# Patient Record
Sex: MALE | Race: OTHER | HISPANIC OR LATINO | URBAN - METROPOLITAN AREA
[De-identification: names, ages, dates, MRNs, and addresses within clinical notes are randomized per-mention and may not be internally consistent; named-entity substitution may affect disease eponyms.]

---

## 2017-12-29 ENCOUNTER — EMERGENCY (EMERGENCY)
Facility: HOSPITAL | Age: 37
LOS: 1 days | Discharge: HOME | End: 2017-12-29
Attending: EMERGENCY MEDICINE | Admitting: EMERGENCY MEDICINE
Payer: SELF-PAY

## 2017-12-29 VITALS
RESPIRATION RATE: 20 BRPM | HEART RATE: 86 BPM | DIASTOLIC BLOOD PRESSURE: 100 MMHG | OXYGEN SATURATION: 99 % | TEMPERATURE: 98 F | SYSTOLIC BLOOD PRESSURE: 139 MMHG | WEIGHT: 169.98 LBS

## 2017-12-29 LAB
ALBUMIN SERPL ELPH-MCNC: 4.6 G/DL — SIGNIFICANT CHANGE UP (ref 3.3–5.2)
ALP SERPL-CCNC: 98 U/L — SIGNIFICANT CHANGE UP (ref 40–120)
ALT FLD-CCNC: 54 U/L — HIGH
ANION GAP SERPL CALC-SCNC: 17 MMOL/L — SIGNIFICANT CHANGE UP (ref 5–17)
APTT BLD: 28.7 SEC — SIGNIFICANT CHANGE UP (ref 27.5–37.4)
AST SERPL-CCNC: 33 U/L — SIGNIFICANT CHANGE UP
BASOPHILS # BLD AUTO: 0 K/UL — SIGNIFICANT CHANGE UP (ref 0–0.2)
BASOPHILS NFR BLD AUTO: 0.1 % — SIGNIFICANT CHANGE UP (ref 0–2)
BILIRUB SERPL-MCNC: 0.4 MG/DL — SIGNIFICANT CHANGE UP (ref 0.4–2)
BUN SERPL-MCNC: 14 MG/DL — SIGNIFICANT CHANGE UP (ref 8–20)
CALCIUM SERPL-MCNC: 9.1 MG/DL — SIGNIFICANT CHANGE UP (ref 8.6–10.2)
CHLORIDE SERPL-SCNC: 102 MMOL/L — SIGNIFICANT CHANGE UP (ref 98–107)
CO2 SERPL-SCNC: 21 MMOL/L — LOW (ref 22–29)
CREAT SERPL-MCNC: 0.75 MG/DL — SIGNIFICANT CHANGE UP (ref 0.5–1.3)
EOSINOPHIL # BLD AUTO: 0 K/UL — SIGNIFICANT CHANGE UP (ref 0–0.5)
EOSINOPHIL NFR BLD AUTO: 0.2 % — SIGNIFICANT CHANGE UP (ref 0–6)
ETHANOL SERPL-MCNC: 81 MG/DL — SIGNIFICANT CHANGE UP
GLUCOSE SERPL-MCNC: 108 MG/DL — SIGNIFICANT CHANGE UP (ref 70–115)
HCT VFR BLD CALC: 45.2 % — SIGNIFICANT CHANGE UP (ref 42–52)
HGB BLD-MCNC: 15.9 G/DL — SIGNIFICANT CHANGE UP (ref 14–18)
INR BLD: 1.01 RATIO — SIGNIFICANT CHANGE UP (ref 0.88–1.16)
LIDOCAIN IGE QN: 24 U/L — SIGNIFICANT CHANGE UP (ref 22–51)
LYMPHOCYTES # BLD AUTO: 1.3 K/UL — SIGNIFICANT CHANGE UP (ref 1–4.8)
LYMPHOCYTES # BLD AUTO: 7.4 % — LOW (ref 20–55)
MCHC RBC-ENTMCNC: 30.3 PG — SIGNIFICANT CHANGE UP (ref 27–31)
MCHC RBC-ENTMCNC: 35.2 G/DL — SIGNIFICANT CHANGE UP (ref 32–36)
MCV RBC AUTO: 86.1 FL — SIGNIFICANT CHANGE UP (ref 80–94)
MONOCYTES # BLD AUTO: 0.5 K/UL — SIGNIFICANT CHANGE UP (ref 0–0.8)
MONOCYTES NFR BLD AUTO: 3 % — SIGNIFICANT CHANGE UP (ref 3–10)
NEUTROPHILS # BLD AUTO: 16 K/UL — HIGH (ref 1.8–8)
NEUTROPHILS NFR BLD AUTO: 89.1 % — HIGH (ref 37–73)
PLATELET # BLD AUTO: 296 K/UL — SIGNIFICANT CHANGE UP (ref 150–400)
POTASSIUM SERPL-MCNC: 3.5 MMOL/L — SIGNIFICANT CHANGE UP (ref 3.5–5.3)
POTASSIUM SERPL-SCNC: 3.5 MMOL/L — SIGNIFICANT CHANGE UP (ref 3.5–5.3)
PROT SERPL-MCNC: 7.7 G/DL — SIGNIFICANT CHANGE UP (ref 6.6–8.7)
PROTHROM AB SERPL-ACNC: 11.1 SEC — SIGNIFICANT CHANGE UP (ref 9.8–12.7)
RBC # BLD: 5.25 M/UL — SIGNIFICANT CHANGE UP (ref 4.6–6.2)
RBC # FLD: 12.1 % — SIGNIFICANT CHANGE UP (ref 11–15.6)
SODIUM SERPL-SCNC: 140 MMOL/L — SIGNIFICANT CHANGE UP (ref 135–145)
WBC # BLD: 18 K/UL — HIGH (ref 4.8–10.8)
WBC # FLD AUTO: 18 K/UL — HIGH (ref 4.8–10.8)

## 2017-12-29 PROCEDURE — 99285 EMERGENCY DEPT VISIT HI MDM: CPT | Mod: 25

## 2017-12-29 PROCEDURE — 99284 EMERGENCY DEPT VISIT MOD MDM: CPT

## 2017-12-29 PROCEDURE — T1013: CPT

## 2017-12-29 PROCEDURE — 85027 COMPLETE CBC AUTOMATED: CPT

## 2017-12-29 PROCEDURE — 83690 ASSAY OF LIPASE: CPT

## 2017-12-29 PROCEDURE — 85730 THROMBOPLASTIN TIME PARTIAL: CPT

## 2017-12-29 PROCEDURE — 85610 PROTHROMBIN TIME: CPT

## 2017-12-29 PROCEDURE — 96375 TX/PRO/DX INJ NEW DRUG ADDON: CPT

## 2017-12-29 PROCEDURE — 80053 COMPREHEN METABOLIC PANEL: CPT

## 2017-12-29 PROCEDURE — 96374 THER/PROPH/DIAG INJ IV PUSH: CPT

## 2017-12-29 PROCEDURE — 80307 DRUG TEST PRSMV CHEM ANLYZR: CPT

## 2017-12-29 PROCEDURE — 36415 COLL VENOUS BLD VENIPUNCTURE: CPT

## 2017-12-29 RX ORDER — EMTRICITABINE AND TENOFOVIR DISOPROXIL FUMARATE 200; 300 MG/1; MG/1
1 TABLET, FILM COATED ORAL
Qty: 0 | Refills: 0 | COMMUNITY

## 2017-12-29 RX ORDER — METOCLOPRAMIDE HCL 10 MG
10 TABLET ORAL ONCE
Qty: 0 | Refills: 0 | Status: COMPLETED | OUTPATIENT
Start: 2017-12-29 | End: 2017-12-29

## 2017-12-29 RX ORDER — SODIUM CHLORIDE 9 MG/ML
1000 INJECTION INTRAMUSCULAR; INTRAVENOUS; SUBCUTANEOUS ONCE
Qty: 0 | Refills: 0 | Status: COMPLETED | OUTPATIENT
Start: 2017-12-29 | End: 2017-12-29

## 2017-12-29 RX ORDER — PANTOPRAZOLE SODIUM 20 MG/1
40 TABLET, DELAYED RELEASE ORAL ONCE
Qty: 0 | Refills: 0 | Status: COMPLETED | OUTPATIENT
Start: 2017-12-29 | End: 2017-12-29

## 2017-12-29 RX ADMIN — SODIUM CHLORIDE 1000 MILLILITER(S): 9 INJECTION INTRAMUSCULAR; INTRAVENOUS; SUBCUTANEOUS at 21:47

## 2017-12-29 RX ADMIN — Medication 10 MILLIGRAM(S): at 21:47

## 2017-12-29 RX ADMIN — PANTOPRAZOLE SODIUM 40 MILLIGRAM(S): 20 TABLET, DELAYED RELEASE ORAL at 21:48

## 2017-12-29 NOTE — ED PROVIDER NOTE - MEDICAL DECISION MAKING DETAILS
36 y/o M with alcohol intoxication will observe for sobriety. 38 y/o with accidental overdose of alcohol will observe and DC home.

## 2017-12-29 NOTE — ED PROVIDER NOTE - UNABLE TO OBTAIN
Hx limited due to acute alcohol intoxication. Dementia ROS limited due to acute alcohol intoxication.

## 2017-12-29 NOTE — ED ADULT NURSE NOTE - OBJECTIVE STATEMENT
Pt. presents to ED with c/o right hand stiffness and drinking for past two days. Pt. denies Hx of alcohol abuse or SI/HI. pt. in yellow gown as per protocol. HIV + on HAART therapy. compliant with meds

## 2017-12-29 NOTE — ED PROVIDER NOTE - OBJECTIVE STATEMENT
This is a 36 y/o patient with Hx of HIV BIB family for alcohol intoxication. Pt is sleeping comfortably in bed, but easily arousable. Affect consistent with alcohol intoxication. No signs of respiratory distress or deformities. Pt is unable to provide a reliable history at this time. History is limited due to suspected intoxication. As per significant other at bedside she reports pt does not usually drink and drank significantly today. This is a 38 y/o patient with Hx of HIV BIB family for alcohol intoxication. Pt is sleeping comfortably in bed, but easily arousable. Affect consistent with alcohol intoxication. No signs of respiratory distress or deformities. Pt is unable to provide a reliable history at this time. History is limited due to suspected intoxication. As per significant other at bedside she reports they were at a party, pt does not usually drink and drank two bottles of patron today.

## 2017-12-29 NOTE — ED STATDOCS - PROGRESS NOTE DETAILS
This is a 38 y/o M with a history of HIV triple level positive on HAART therapy presenting to the ED complaining of right hand stiffness since 1700 today. Patient reports that he was drinking today when he started to have right hand stiffness. Patient reports drinking Patron x 2 bottles today, Denies history of EtOH abuse that significant other verifies. Patient reports that his last drink was at 1700 today. Endorses nausea and vomiting. Significant other reports that patient is intoxicated. Denies abdominal pain. Focused eval, protocol orders entered. Pt to be moved to main ED for complete evaluation by another provider. Patient actively hiccuping during examination.

## 2017-12-30 VITALS
OXYGEN SATURATION: 100 % | SYSTOLIC BLOOD PRESSURE: 128 MMHG | RESPIRATION RATE: 18 BRPM | HEART RATE: 88 BPM | TEMPERATURE: 98 F | DIASTOLIC BLOOD PRESSURE: 92 MMHG

## 2022-08-15 NOTE — ED PROVIDER NOTE - CPE EDP GASTRO NORM
CLINICAL PHARMACY NOTE: MEDS TO BEDS    Total # of Prescriptions Filled: 1   The following medications were delivered to the patient:  Marc    Additional Documentation: normal...

## 2022-10-12 NOTE — ED ADULT NURSE NOTE - NS ED NOTE  TALK SOMEONE YN
Manasa Perrin  Phone: [phone]   Fax: [phone]          Physical Therapy Treatment Note/ Progress Report:     Date:  10/12/2022    Patient Name:  Estefany Blas    :  1983  MRN: [id number]  Alvarado Hospital Medical CenterSINCERE    [email or web address]    Exercises  08040)   [] EVAL - MOD (74668)  [] EVAL - HIGH (68107)  [] RE-EVAL (53917)  [x] ZR(17792) x 1 TJ    [] Ionto  [x] NMR (11209) x1  JS [] Vaso  [x] Manual (25305) x 2      [] Ultrasound  [] TA x   1     [] Mech Traction (25402)  [] Aquatic Therapy x     [] ES (un) (22416):   [] Home Management Training x  [] ES(attended) (71872)   [x] Dry Needling 1-2 muscles (58231):  [] Dry Needling 3+ muscles (507894  [] [employer]   []  Not Ready for Return to Sports   Comments:    PLAN:  Continue to monitor posture   [x] Continue per plan of care [] Alter current plan (see comments)  [] Plan of care initiated [] Hold pending MD visit [] Discharge    Electronically signed by: Magda Flanagan PT, #[id number]  Assisted by Yenifer Barbosa. [address]        Note: If patient does not return for scheduled/ recommended follow up visits, this note will serve as a discharge from care along with most recent update on progress. No

## 2024-05-24 ENCOUNTER — EMERGENCY (EMERGENCY)
Facility: HOSPITAL | Age: 44
LOS: 1 days | Discharge: HOME | End: 2024-05-24
Attending: STUDENT IN AN ORGANIZED HEALTH CARE EDUCATION/TRAINING PROGRAM
Payer: SELF-PAY

## 2024-05-24 VITALS
TEMPERATURE: 98 F | HEART RATE: 65 BPM | RESPIRATION RATE: 20 BRPM | OXYGEN SATURATION: 98 % | WEIGHT: 155.43 LBS | DIASTOLIC BLOOD PRESSURE: 106 MMHG | SYSTOLIC BLOOD PRESSURE: 149 MMHG | HEIGHT: 62.99 IN

## 2024-05-24 PROBLEM — B20 HUMAN IMMUNODEFICIENCY VIRUS [HIV] DISEASE: Chronic | Status: ACTIVE | Noted: 2017-12-29

## 2024-05-24 PROCEDURE — 99284 EMERGENCY DEPT VISIT MOD MDM: CPT

## 2024-05-24 PROCEDURE — T1013: CPT

## 2024-05-24 PROCEDURE — 99283 EMERGENCY DEPT VISIT LOW MDM: CPT

## 2024-05-24 RX ORDER — METHOCARBAMOL 500 MG/1
2 TABLET, FILM COATED ORAL
Qty: 18 | Refills: 0
Start: 2024-05-24 | End: 2024-05-26

## 2024-05-24 RX ORDER — ACETAMINOPHEN 500 MG
650 TABLET ORAL ONCE
Refills: 0 | Status: COMPLETED | OUTPATIENT
Start: 2024-05-24 | End: 2024-05-24

## 2024-05-24 RX ORDER — LIDOCAINE 4 G/100G
1 CREAM TOPICAL ONCE
Refills: 0 | Status: COMPLETED | OUTPATIENT
Start: 2024-05-24 | End: 2024-05-24

## 2024-05-24 RX ORDER — METHOCARBAMOL 500 MG/1
1500 TABLET, FILM COATED ORAL ONCE
Refills: 0 | Status: COMPLETED | OUTPATIENT
Start: 2024-05-24 | End: 2024-05-24

## 2024-05-24 RX ADMIN — Medication 650 MILLIGRAM(S): at 06:36

## 2024-05-24 RX ADMIN — LIDOCAINE 1 PATCH: 4 CREAM TOPICAL at 06:36

## 2024-05-24 RX ADMIN — METHOCARBAMOL 1500 MILLIGRAM(S): 500 TABLET, FILM COATED ORAL at 06:36

## 2024-05-24 NOTE — ED PROVIDER NOTE - OBJECTIVE STATEMENT
43 year old male pmhx HIV (HAART) present to ED for lower back pain. 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 pt states pain has progressively increased, worse with movement. pt states he has taken advil without relief. pt denies numbness, tingling, weakness, saddle anesthesia, incontinence, fever, weight loss, chills, SOB, CP, abd pain.

## 2024-05-24 NOTE — ED PROVIDER NOTE - CLINICAL SUMMARY MEDICAL DECISION MAKING FREE TEXT BOX
43 year old male pmhx HIV (HAART) present to ED for lower back pain. 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 pt states pain has progressively increased, worse with movement. pt states he has taken advil without relief. pt denies numbness, tingling, weakness, saddle anesthesia, incontinence, fever, weight loss, chills, SOB, CP, abd pain.   Meds, re-assess 43 year old male pmhx HIV (HAART) present to ED for lower back pain. 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 pt states pain has progressively increased, worse with movement. pt states he has taken advil without relief. pt denies numbness, tingling, weakness, saddle anesthesia, incontinence, fever, weight loss, chills, SOB, CP, abd pain.   Meds, re-assess    Pt reassessed, pt feeling better at this time, vss, pt able to walk, talk and vocalized plan of action. Discussed in depth and explained to pt in depth the next steps that need to be taken including proper follow up with PCP or specialists. All incidental findings were discussed with pt as well. Pt verbalized their concerns and all questions were answered. Pt understands dispo and wants discharge. Given good instructions when to return to ED, strict return precautions and importance of f/u.

## 2024-05-24 NOTE — ED ADULT NURSE NOTE - OBJECTIVE STATEMENT
A&OX4, presents to ED with c/o lower back pain, patient reports pain started three days ago, while he was cleaning his car.  Denies trauma/injury/bowel/bladder incontinence.  Last Advil 3 hours PTA

## 2024-05-24 NOTE — ED PROVIDER NOTE - PHYSICAL EXAMINATION
Gen: No acute distress, non toxic  HEENT: Mucous membranes moist, pink conjunctivae, EOMI. PERRL. Airway patent.   CV: RRR, nl s1/s2.  Resp: CTAB, normal rate and effort. No wheezes, rhonchi, or crackles.   GI: Abdomen soft, NT, ND. No rebound, no guarding  Neuro: A&O x4, MAEx4. 5/5 str ext x 4. Sensation intact, symmetric throughout. No FND's. Gait intact. CN 2-12 intact.   MSK: b/l lumbar paraspinal muscle ttp. No midline spinal ttp. No visualized or palpable deformities.  Skin: No rashes, skin intact and well perfused. Cap refill <2sec  Vascular: Radial and dorsalis pedal pulses 2+ b/l

## 2024-05-24 NOTE — ED ADULT TRIAGE NOTE - CHIEF COMPLAINT QUOTE
Patient presents to ED with c/o lower back pain times 3 days.  Pain worse with movement.  Denies trauma/injury/bowel/bladder incontinence.  Last Advil 3 hours PTA

## 2024-05-24 NOTE — ED PROVIDER NOTE - ATTENDING APP SHARED VISIT CONTRIBUTION OF CARE
43M hx of HIV, on HAART, presenting with lower back pain in the setting of bending to clean his car a few days ago. Denies any numbness/weakness, no tingling, no saddle anesthesia, denies other sx of concern at this time. Will trial him on supportive meds, reassess, dispo.

## 2024-05-24 NOTE — ED ADULT NURSE NOTE - NSFALLUNIVINTERV_ED_ALL_ED
Bed/Stretcher in lowest position, wheels locked, appropriate side rails in place/Call bell, personal items and telephone in reach/Instruct patient to call for assistance before getting out of bed/chair/stretcher/Non-slip footwear applied when patient is off stretcher/[place] to call system/Physically safe environment - no spills, clutter or unnecessary equipment/Purposeful proactive rounding/Room/bathroom lighting operational, light cord in reach

## 2024-05-24 NOTE — ED PROVIDER NOTE - PATIENT PORTAL LINK FT
You can access the FollowMyHealth Patient Portal offered by [facility] by registering at the following website: [email or web address]. By joining [employer]’s FollowMyHealth portal, you will also be able to view your health information using other applications (apps) compatible with our system.

## 2024-05-24 NOTE — ED ADULT NURSE NOTE - NS ED NOTE  TALK SOMEONE YN
Chino Vasquez RN made aware of ejection fraction of 57%, from prior 71% 6 months ago  Stated that office was aware, no new orders at this time  No

## 2024-08-26 ENCOUNTER — INPATIENT (INPATIENT)
Facility: HOSPITAL | Age: 44
LOS: 1 days | Discharge: HOME | DRG: 392 | End: 2024-08-28
Attending: STUDENT IN AN ORGANIZED HEALTH CARE EDUCATION/TRAINING PROGRAM | Admitting: STUDENT IN AN ORGANIZED HEALTH CARE EDUCATION/TRAINING PROGRAM
Payer: MEDICAID

## 2024-08-26 VITALS
WEIGHT: 196.87 LBS | SYSTOLIC BLOOD PRESSURE: 145 MMHG | OXYGEN SATURATION: 100 % | HEART RATE: 65 BPM | TEMPERATURE: 98 F | DIASTOLIC BLOOD PRESSURE: 90 MMHG | RESPIRATION RATE: 17 BRPM

## 2024-08-26 DIAGNOSIS — R10.9 UNSPECIFIED ABDOMINAL PAIN: ICD-10-CM

## 2024-08-26 LAB
ALBUMIN SERPL ELPH-MCNC: 4.3 G/DL — SIGNIFICANT CHANGE UP (ref 3.3–5.2)
ALP SERPL-CCNC: 103 U/L — SIGNIFICANT CHANGE UP (ref 40–120)
ALT FLD-CCNC: 34 U/L — SIGNIFICANT CHANGE UP
ANION GAP SERPL CALC-SCNC: 16 MMOL/L — SIGNIFICANT CHANGE UP (ref 5–17)
APPEARANCE UR: CLEAR — SIGNIFICANT CHANGE UP
APTT BLD: 29 SEC — SIGNIFICANT CHANGE UP (ref 24.5–35.6)
AST SERPL-CCNC: 24 U/L — SIGNIFICANT CHANGE UP
BASOPHILS # BLD AUTO: 0.03 K/UL — SIGNIFICANT CHANGE UP (ref 0–0.2)
BASOPHILS NFR BLD AUTO: 0.2 % — SIGNIFICANT CHANGE UP (ref 0–2)
BILIRUB SERPL-MCNC: 0.4 MG/DL — SIGNIFICANT CHANGE UP (ref 0.4–2)
BILIRUB UR-MCNC: NEGATIVE — SIGNIFICANT CHANGE UP
BUN SERPL-MCNC: 12.8 MG/DL — SIGNIFICANT CHANGE UP (ref 8–20)
CALCIUM SERPL-MCNC: 9.2 MG/DL — SIGNIFICANT CHANGE UP (ref 8.4–10.5)
CHLORIDE SERPL-SCNC: 105 MMOL/L — SIGNIFICANT CHANGE UP (ref 96–108)
CO2 SERPL-SCNC: 22 MMOL/L — SIGNIFICANT CHANGE UP (ref 22–29)
COLOR SPEC: YELLOW — SIGNIFICANT CHANGE UP
CREAT SERPL-MCNC: 0.79 MG/DL — SIGNIFICANT CHANGE UP (ref 0.5–1.3)
DIFF PNL FLD: NEGATIVE — SIGNIFICANT CHANGE UP
EGFR: 114 ML/MIN/1.73M2 — SIGNIFICANT CHANGE UP
EOSINOPHIL # BLD AUTO: 0.04 K/UL — SIGNIFICANT CHANGE UP (ref 0–0.5)
EOSINOPHIL NFR BLD AUTO: 0.3 % — SIGNIFICANT CHANGE UP (ref 0–6)
GLUCOSE SERPL-MCNC: 111 MG/DL — HIGH (ref 70–99)
GLUCOSE UR QL: NEGATIVE MG/DL — SIGNIFICANT CHANGE UP
HCT VFR BLD CALC: 43 % — SIGNIFICANT CHANGE UP (ref 39–50)
HGB BLD-MCNC: 15.5 G/DL — SIGNIFICANT CHANGE UP (ref 13–17)
IMM GRANULOCYTES NFR BLD AUTO: 0.3 % — SIGNIFICANT CHANGE UP (ref 0–0.9)
INR BLD: 1.06 RATIO — SIGNIFICANT CHANGE UP (ref 0.85–1.18)
KETONES UR-MCNC: NEGATIVE MG/DL — SIGNIFICANT CHANGE UP
LEUKOCYTE ESTERASE UR-ACNC: NEGATIVE — SIGNIFICANT CHANGE UP
LIDOCAIN IGE QN: 33 U/L — SIGNIFICANT CHANGE UP (ref 22–51)
LYMPHOCYTES # BLD AUTO: 1.8 K/UL — SIGNIFICANT CHANGE UP (ref 1–3.3)
LYMPHOCYTES # BLD AUTO: 12.2 % — LOW (ref 13–44)
MCHC RBC-ENTMCNC: 30.9 PG — SIGNIFICANT CHANGE UP (ref 27–34)
MCHC RBC-ENTMCNC: 36 GM/DL — SIGNIFICANT CHANGE UP (ref 32–36)
MCV RBC AUTO: 85.7 FL — SIGNIFICANT CHANGE UP (ref 80–100)
MONOCYTES # BLD AUTO: 0.56 K/UL — SIGNIFICANT CHANGE UP (ref 0–0.9)
MONOCYTES NFR BLD AUTO: 3.8 % — SIGNIFICANT CHANGE UP (ref 2–14)
NEUTROPHILS # BLD AUTO: 12.26 K/UL — HIGH (ref 1.8–7.4)
NEUTROPHILS NFR BLD AUTO: 83.2 % — HIGH (ref 43–77)
NITRITE UR-MCNC: NEGATIVE — SIGNIFICANT CHANGE UP
PH UR: 7.5 — SIGNIFICANT CHANGE UP (ref 5–8)
PLATELET # BLD AUTO: 278 K/UL — SIGNIFICANT CHANGE UP (ref 150–400)
POTASSIUM SERPL-MCNC: 3.4 MMOL/L — LOW (ref 3.5–5.3)
POTASSIUM SERPL-SCNC: 3.4 MMOL/L — LOW (ref 3.5–5.3)
PROT SERPL-MCNC: 7.6 G/DL — SIGNIFICANT CHANGE UP (ref 6.6–8.7)
PROT UR-MCNC: SIGNIFICANT CHANGE UP MG/DL
PROTHROM AB SERPL-ACNC: 11.7 SEC — SIGNIFICANT CHANGE UP (ref 9.5–13)
RBC # BLD: 5.02 M/UL — SIGNIFICANT CHANGE UP (ref 4.2–5.8)
RBC # FLD: 12.1 % — SIGNIFICANT CHANGE UP (ref 10.3–14.5)
SODIUM SERPL-SCNC: 143 MMOL/L — SIGNIFICANT CHANGE UP (ref 135–145)
SP GR SPEC: >=1.099 (ref 1–1.03)
UROBILINOGEN FLD QL: 0.2 MG/DL — SIGNIFICANT CHANGE UP (ref 0.2–1)
WBC # BLD: 14.74 K/UL — HIGH (ref 3.8–10.5)
WBC # FLD AUTO: 14.74 K/UL — HIGH (ref 3.8–10.5)

## 2024-08-26 PROCEDURE — 74177 CT ABD & PELVIS W/CONTRAST: CPT | Mod: 26,MC

## 2024-08-26 PROCEDURE — 76705 ECHO EXAM OF ABDOMEN: CPT | Mod: 26

## 2024-08-26 PROCEDURE — 99285 EMERGENCY DEPT VISIT HI MDM: CPT

## 2024-08-26 PROCEDURE — 93010 ELECTROCARDIOGRAM REPORT: CPT

## 2024-08-26 RX ORDER — ONDANSETRON 2 MG/ML
4 INJECTION, SOLUTION INTRAMUSCULAR; INTRAVENOUS ONCE
Refills: 0 | Status: COMPLETED | OUTPATIENT
Start: 2024-08-26 | End: 2024-08-26

## 2024-08-26 RX ORDER — CEFOTETAN 2 G/1
INJECTION, POWDER, FOR SOLUTION INTRAMUSCULAR; INTRAVENOUS
Refills: 0 | Status: DISCONTINUED | OUTPATIENT
Start: 2024-08-26 | End: 2024-08-27

## 2024-08-26 RX ORDER — CEFOTETAN 2 G/1
2 INJECTION, POWDER, FOR SOLUTION INTRAMUSCULAR; INTRAVENOUS ONCE
Refills: 0 | Status: COMPLETED | OUTPATIENT
Start: 2024-08-26 | End: 2024-08-26

## 2024-08-26 RX ORDER — POTASSIUM CHLORIDE 10 MEQ
20 TABLET, EXT RELEASE, PARTICLES/CRYSTALS ORAL ONCE
Refills: 0 | Status: COMPLETED | OUTPATIENT
Start: 2024-08-26 | End: 2024-08-26

## 2024-08-26 RX ORDER — SODIUM CHLORIDE 9 MG/ML
1000 INJECTION INTRAMUSCULAR; INTRAVENOUS; SUBCUTANEOUS ONCE
Refills: 0 | Status: COMPLETED | OUTPATIENT
Start: 2024-08-26 | End: 2024-08-26

## 2024-08-26 RX ORDER — ENOXAPARIN SODIUM 100 MG/ML
40 INJECTION SUBCUTANEOUS EVERY 24 HOURS
Refills: 0 | Status: DISCONTINUED | OUTPATIENT
Start: 2024-08-26 | End: 2024-08-27

## 2024-08-26 RX ORDER — CEFOTETAN 2 G/1
2 INJECTION, POWDER, FOR SOLUTION INTRAMUSCULAR; INTRAVENOUS EVERY 12 HOURS
Refills: 0 | Status: DISCONTINUED | OUTPATIENT
Start: 2024-08-27 | End: 2024-08-27

## 2024-08-26 RX ORDER — FAMOTIDINE 10 MG/ML
20 INJECTION INTRAVENOUS ONCE
Refills: 0 | Status: COMPLETED | OUTPATIENT
Start: 2024-08-26 | End: 2024-08-26

## 2024-08-26 RX ORDER — ACETAMINOPHEN 325 MG/1
975 TABLET ORAL EVERY 6 HOURS
Refills: 0 | Status: DISCONTINUED | OUTPATIENT
Start: 2024-08-26 | End: 2024-08-27

## 2024-08-26 RX ORDER — KETOROLAC TROMETHAMINE 30 MG/ML
30 INJECTION, SOLUTION INTRAMUSCULAR ONCE
Refills: 0 | Status: DISCONTINUED | OUTPATIENT
Start: 2024-08-26 | End: 2024-08-26

## 2024-08-26 RX ORDER — ACETAMINOPHEN 325 MG/1
1000 TABLET ORAL ONCE
Refills: 0 | Status: COMPLETED | OUTPATIENT
Start: 2024-08-26 | End: 2024-08-26

## 2024-08-26 RX ADMIN — SODIUM CHLORIDE 1000 MILLILITER(S): 9 INJECTION INTRAMUSCULAR; INTRAVENOUS; SUBCUTANEOUS at 01:43

## 2024-08-26 RX ADMIN — KETOROLAC TROMETHAMINE 30 MILLIGRAM(S): 30 INJECTION, SOLUTION INTRAMUSCULAR at 01:40

## 2024-08-26 RX ADMIN — ENOXAPARIN SODIUM 40 MILLIGRAM(S): 100 INJECTION SUBCUTANEOUS at 16:00

## 2024-08-26 RX ADMIN — ACETAMINOPHEN 975 MILLIGRAM(S): 325 TABLET ORAL at 17:19

## 2024-08-26 RX ADMIN — CEFOTETAN 100 GRAM(S): 2 INJECTION, POWDER, FOR SOLUTION INTRAMUSCULAR; INTRAVENOUS at 13:17

## 2024-08-26 RX ADMIN — Medication 100 MILLILITER(S): at 16:01

## 2024-08-26 RX ADMIN — Medication 20 MILLIEQUIVALENT(S): at 03:12

## 2024-08-26 RX ADMIN — ACETAMINOPHEN 400 MILLIGRAM(S): 325 TABLET ORAL at 03:48

## 2024-08-26 RX ADMIN — FAMOTIDINE 20 MILLIGRAM(S): 10 INJECTION INTRAVENOUS at 01:41

## 2024-08-26 RX ADMIN — ONDANSETRON 4 MILLIGRAM(S): 2 INJECTION, SOLUTION INTRAMUSCULAR; INTRAVENOUS at 01:42

## 2024-08-26 NOTE — ED ADULT NURSE NOTE - NS ED NURSE LEVEL OF CONSCIOUSNESS MENTAL STATUS
UVC repositioning     UVC projecting over PFO on CXR. Was pulled back 1 cm  to 9 cm      KESHA Patton-CNP 2019 2:01 PM     Awake/Alert

## 2024-08-26 NOTE — ED PROVIDER NOTE - NSFOLLOWUPINSTRUCTIONS_ED_ALL_ED_FT
Dieta líquida boubacar, avance lentamente a alimentos blandos según la tolerancia Consulte con roberts médico de atención primaria en 2 o 3 días Regrese a la taisha de emergencias si tiene síntomas nuevos o que empeoran  Náuseas/vómitos  Las náuseas son la sensación de tener que vomitar. A medida que las náuseas empeoran, pueden provocar vómitos. Los vómitos aumentan el riesgo de deshidratación. Los adultos mayores y las personas con otras enfermedades o un sistema inmunológico débil tienen un mayor riesgo de deshidratación. Lynn líquidos kourtney en cantidades pequeñas zaira frecuentes según la tolerancia. Coma alimentos blandos y fáciles de digerir en pequeñas cantidades según la tolerancia.  BUSQUE ATENCIÓN MÉDICA INMEDIATA SI TIENE ALGUNO DE LOS SIGUIENTES SÍNTOMAS: fiebre, incapacidad para retener suficientes líquidos, vómitos negros o con charlotte, heces negras o con charlotte, aturdimiento/vértigo, dolor en el pecho, dolor de peyton intenso, sarpullido, dificultad para respirar, piel fría o húmeda, confusión, dolor al orinar o cualquier signo de deshidratación.

## 2024-08-26 NOTE — ED PROVIDER NOTE - ATTENDING APP SHARED VISIT CONTRIBUTION OF CARE
43 yo M hx HIV presents to ER c/o generalized abdominal pain with nausea/vomiting since 4pm after having a late lunch. patient denied nausea, vomiting, fever, chills or diarrhea. no recent sick contacts.     I, Robinson Geiger, evaluated the patient with the PA, and completed the key components of the history and physical exam. I then discussed the management plan with the PA.

## 2024-08-26 NOTE — H&P ADULT - HISTORY OF PRESENT ILLNESS
HPI: 44y Male no PMHx present to ED with 1 day of abdominal pain , N, after his dinner yesterday. denies fevers/chills, denies lightheadedness/dizziness, denies SOB/chest pain, denies nausea/vomiting,     ROS: 10-system review is otherwise negative except HPI above.      PAST MEDICAL & SURGICAL HISTORY:  HIV (human immunodeficiency virus infection)        FAMILY HISTORY:    Family history not pertinent as reviewed with the patient.    SOCIAL HISTORY:  Denies any toxic habits    ALLERGIES: NKA No Known Allergies      Home Medications:  Descovy 200 mg-25 mg oral tablet: 1 tab(s) orally once a day (29 Dec 2017 22:05)      --------------------------------------------------------------------------------------------    PHYSICAL EXAM:   General: NAD, Lying in bed comfortably  Neuro: A+Ox3  HEENT: EOMI, PERRLA, MMM  Cardio: RRR  Resp: Non labored breathing on RA  GI/Abd: Soft, epigastric Tenderness /ND,   Vascular: All 4 extremities warm and well perfused.   Pelvis: stable  Musculoskeletal: All 4 extremities moving spontaneously, no limitations, no spinal tenderness.  --------------------------------------------------------------------------------------------    LABS                 15.5   14.74  )----------(  278       ( 26 Aug 2024 01:44 )               43.0      143    |  105    |  12.8   ----------------------------<  111        ( 26 Aug 2024 01:44 )  3.4     |  22.0   |  0.79     Ca    9.2        ( 26 Aug 2024 01:44 )    TPro  7.6    /  Alb  4.3    /  TBili  0.4    /  DBili  x      /  AST  24     /  ALT  34     /  AlkPhos  103    ( 26 Aug 2024 01:44 )    LIVER FUNCTIONS - ( 26 Aug 2024 01:44 )  Alb: 4.3 g/dL / Pro: 7.6 g/dL / ALK PHOS: 103 U/L / ALT: 34 U/L / AST: 24 U/L / GGT: x           PT/INR -  11.7 sec / 1.06 ratio   ( 26 Aug 2024 10:20 )       PTT -  29.0 sec   ( 26 Aug 2024 10:20 )  CAPILLARY BLOOD GLUCOSE        Urinalysis Basic - ( 26 Aug 2024 08:01 )    Color: Yellow / Appearance: Clear / SG: >=1.099 / pH: x  Gluc: x / Ketone: Negative mg/dL  / Bili: Negative / Urobili: 0.2 mg/dL   Blood: x / Protein: Trace mg/dL / Nitrite: Negative   Leuk Esterase: Negative / RBC: x / WBC x   Sq Epi: x / Non Sq Epi: x / Bacteria: x          --------------------------------------------------------------------------------------------

## 2024-08-26 NOTE — ED ADULT NURSE REASSESSMENT NOTE - NS ED NURSE REASSESS COMMENT FT1
Assumed care of patient at 07:15 from EVELYN Bal. Charting as noted. Patient AA&Ox4, resp even/unlabored, presents to ED c/o abd pain. At time of assessment, patient denies pain/discomfort, denies CP/SOB. Patient updated on the plan of care, awaiting CT result. Stretcher locked in lowest position, IV site flushed w/ NS. No redness, swelling or pain noted to site. No signs of acute distress noted, safety maintained.

## 2024-08-26 NOTE — H&P ADULT - ASSESSMENT
ASSESSMENT: Patient is a 44y old male with 1 day of abdominal pain leukocytosis , evidence of acute cholecystitis on CT scan     PLAN:    - Admit to [facility] floor -Dr Rivera   - NPO/IVF  - IV cefotetan   - pain control  - DVT ppx  - OOB/ambulate  - strict I/Os  - Plan discussed with Attending, Dr. Rivera

## 2024-08-26 NOTE — ED PROVIDER NOTE - CLINICAL SUMMARY MEDICAL DECISION MAKING FREE TEXT BOX
43 yo M hx HIV on HAART presents to ER c/o generalized abd pain with nausea/vomiting started at 4pm after eating rice. Afebrile, abdomen soft, mild epigastric tenderness. WBC 14, K 3.4. labs otherwise unremarkable. 43 yo M hx HIV on HAART presents to ER c/o generalized abd pain with nausea/vomiting started at 4pm after eating rice. Afebrile, abdomen soft, mild epigastric tenderness. WBC 14, K 3.4. labs otherwise unremarkable. CT ?gastritis, also with ?gb wall thickening and bladder wall thickening. Pt still with epigastric ttp on exam. Will add UA and US gallbladder, sign out to MARY knapp pending results

## 2024-08-26 NOTE — ED PROVIDER NOTE - OBJECTIVE STATEMENT
45 yo M hx HIV presents to ER c/o generalized abdominal pain with nausea/vomiting since 4pm, started after eating rice. Denies fever/chills, diarrhea, No past abd surgeries.     # [id number]

## 2024-08-27 ENCOUNTER — RESULT REVIEW (OUTPATIENT)
Age: 44
End: 2024-08-27

## 2024-08-27 LAB
ALBUMIN SERPL ELPH-MCNC: 3.6 G/DL — SIGNIFICANT CHANGE UP (ref 3.3–5.2)
ALP SERPL-CCNC: 77 U/L — SIGNIFICANT CHANGE UP (ref 40–120)
ALT FLD-CCNC: 26 U/L — SIGNIFICANT CHANGE UP
ANION GAP SERPL CALC-SCNC: 11 MMOL/L — SIGNIFICANT CHANGE UP (ref 5–17)
APTT BLD: 31.3 SEC — SIGNIFICANT CHANGE UP (ref 24.5–35.6)
AST SERPL-CCNC: 19 U/L — SIGNIFICANT CHANGE UP
BASOPHILS # BLD AUTO: 0.03 K/UL — SIGNIFICANT CHANGE UP (ref 0–0.2)
BASOPHILS NFR BLD AUTO: 0.4 % — SIGNIFICANT CHANGE UP (ref 0–2)
BILIRUB SERPL-MCNC: 0.6 MG/DL — SIGNIFICANT CHANGE UP (ref 0.4–2)
BLD GP AB SCN SERPL QL: SIGNIFICANT CHANGE UP
BUN SERPL-MCNC: 10.2 MG/DL — SIGNIFICANT CHANGE UP (ref 8–20)
CALCIUM SERPL-MCNC: 8.7 MG/DL — SIGNIFICANT CHANGE UP (ref 8.4–10.5)
CHLORIDE SERPL-SCNC: 106 MMOL/L — SIGNIFICANT CHANGE UP (ref 96–108)
CO2 SERPL-SCNC: 22 MMOL/L — SIGNIFICANT CHANGE UP (ref 22–29)
CREAT SERPL-MCNC: 0.73 MG/DL — SIGNIFICANT CHANGE UP (ref 0.5–1.3)
CULTURE RESULTS: SIGNIFICANT CHANGE UP
EGFR: 116 ML/MIN/1.73M2 — SIGNIFICANT CHANGE UP
EOSINOPHIL # BLD AUTO: 0.15 K/UL — SIGNIFICANT CHANGE UP (ref 0–0.5)
EOSINOPHIL NFR BLD AUTO: 2.2 % — SIGNIFICANT CHANGE UP (ref 0–6)
GLUCOSE BLDC GLUCOMTR-MCNC: 117 MG/DL — HIGH (ref 70–99)
GLUCOSE SERPL-MCNC: 85 MG/DL — SIGNIFICANT CHANGE UP (ref 70–99)
HCT VFR BLD CALC: 40.6 % — SIGNIFICANT CHANGE UP (ref 39–50)
HGB BLD-MCNC: 14 G/DL — SIGNIFICANT CHANGE UP (ref 13–17)
IMM GRANULOCYTES NFR BLD AUTO: 0.3 % — SIGNIFICANT CHANGE UP (ref 0–0.9)
INR BLD: 1.08 RATIO — SIGNIFICANT CHANGE UP (ref 0.85–1.18)
LYMPHOCYTES # BLD AUTO: 2.51 K/UL — SIGNIFICANT CHANGE UP (ref 1–3.3)
LYMPHOCYTES # BLD AUTO: 36.9 % — SIGNIFICANT CHANGE UP (ref 13–44)
MCHC RBC-ENTMCNC: 30.7 PG — SIGNIFICANT CHANGE UP (ref 27–34)
MCHC RBC-ENTMCNC: 34.5 GM/DL — SIGNIFICANT CHANGE UP (ref 32–36)
MCV RBC AUTO: 89 FL — SIGNIFICANT CHANGE UP (ref 80–100)
MONOCYTES # BLD AUTO: 0.56 K/UL — SIGNIFICANT CHANGE UP (ref 0–0.9)
MONOCYTES NFR BLD AUTO: 8.2 % — SIGNIFICANT CHANGE UP (ref 2–14)
NEUTROPHILS # BLD AUTO: 3.53 K/UL — SIGNIFICANT CHANGE UP (ref 1.8–7.4)
NEUTROPHILS NFR BLD AUTO: 52 % — SIGNIFICANT CHANGE UP (ref 43–77)
PLATELET # BLD AUTO: 260 K/UL — SIGNIFICANT CHANGE UP (ref 150–400)
POTASSIUM SERPL-MCNC: 3.5 MMOL/L — SIGNIFICANT CHANGE UP (ref 3.5–5.3)
POTASSIUM SERPL-SCNC: 3.5 MMOL/L — SIGNIFICANT CHANGE UP (ref 3.5–5.3)
PROT SERPL-MCNC: 6.2 G/DL — LOW (ref 6.6–8.7)
PROTHROM AB SERPL-ACNC: 12 SEC — SIGNIFICANT CHANGE UP (ref 9.5–13)
RBC # BLD: 4.56 M/UL — SIGNIFICANT CHANGE UP (ref 4.2–5.8)
RBC # FLD: 12.5 % — SIGNIFICANT CHANGE UP (ref 10.3–14.5)
SODIUM SERPL-SCNC: 139 MMOL/L — SIGNIFICANT CHANGE UP (ref 135–145)
SPECIMEN SOURCE: SIGNIFICANT CHANGE UP
WBC # BLD: 6.8 K/UL — SIGNIFICANT CHANGE UP (ref 3.8–10.5)
WBC # FLD AUTO: 6.8 K/UL — SIGNIFICANT CHANGE UP (ref 3.8–10.5)

## 2024-08-27 PROCEDURE — 99232 SBSQ HOSP IP/OBS MODERATE 35: CPT | Mod: 57

## 2024-08-27 PROCEDURE — 88304 TISSUE EXAM BY PATHOLOGIST: CPT | Mod: 26

## 2024-08-27 PROCEDURE — 47562 LAPAROSCOPIC CHOLECYSTECTOMY: CPT

## 2024-08-27 PROCEDURE — S2900 ROBOTIC SURGICAL SYSTEM: CPT | Mod: NC

## 2024-08-27 DEVICE — LIGATING CLIPS WECK HEMOLOK POLYMER LARGE (PURPLE) 6: Type: IMPLANTABLE DEVICE | Status: FUNCTIONAL

## 2024-08-27 DEVICE — CLIP LIGATING VESOLOCK LGE PRPL: Type: IMPLANTABLE DEVICE | Status: FUNCTIONAL

## 2024-08-27 DEVICE — SURGICEL 2 X 3": Type: IMPLANTABLE DEVICE | Status: FUNCTIONAL

## 2024-08-27 RX ORDER — ACETAMINOPHEN 325 MG/1
975 TABLET ORAL EVERY 6 HOURS
Refills: 0 | Status: DISCONTINUED | OUTPATIENT
Start: 2024-08-27 | End: 2024-08-28

## 2024-08-27 RX ORDER — POTASSIUM CHLORIDE 10 MEQ
40 TABLET, EXT RELEASE, PARTICLES/CRYSTALS ORAL ONCE
Refills: 0 | Status: DISCONTINUED | OUTPATIENT
Start: 2024-08-27 | End: 2024-08-27

## 2024-08-27 RX ORDER — INDOCYANINE GREEN AND WATER 25 MG
5 KIT INJECTION ONCE
Refills: 0 | Status: COMPLETED | OUTPATIENT
Start: 2024-08-27 | End: 2024-08-27

## 2024-08-27 RX ORDER — BICTEGRAVIR SODIUM, EMTRICITABINE, AND TENOFOVIR ALAFENAMIDE FUMARATE 50; 200; 25 MG/1; MG/1; MG/1
1 TABLET ORAL DAILY
Refills: 0 | Status: DISCONTINUED | OUTPATIENT
Start: 2024-08-27 | End: 2024-08-27

## 2024-08-27 RX ORDER — BICTEGRAVIR SODIUM, EMTRICITABINE, AND TENOFOVIR ALAFENAMIDE FUMARATE 50; 200; 25 MG/1; MG/1; MG/1
1 TABLET ORAL DAILY
Refills: 0 | Status: DISCONTINUED | OUTPATIENT
Start: 2024-08-27 | End: 2024-08-28

## 2024-08-27 RX ORDER — POTASSIUM CHLORIDE 10 MEQ
40 TABLET, EXT RELEASE, PARTICLES/CRYSTALS ORAL ONCE
Refills: 0 | Status: DISCONTINUED | OUTPATIENT
Start: 2024-08-27 | End: 2024-08-28

## 2024-08-27 RX ORDER — OXYCODONE HYDROCHLORIDE 5 MG/1
5 TABLET ORAL EVERY 4 HOURS
Refills: 0 | Status: DISCONTINUED | OUTPATIENT
Start: 2024-08-27 | End: 2024-08-28

## 2024-08-27 RX ORDER — OXYCODONE HYDROCHLORIDE 5 MG/1
10 TABLET ORAL EVERY 6 HOURS
Refills: 0 | Status: DISCONTINUED | OUTPATIENT
Start: 2024-08-27 | End: 2024-08-28

## 2024-08-27 RX ORDER — ENOXAPARIN SODIUM 100 MG/ML
40 INJECTION SUBCUTANEOUS EVERY 24 HOURS
Refills: 0 | Status: DISCONTINUED | OUTPATIENT
Start: 2024-08-27 | End: 2024-08-28

## 2024-08-27 RX ORDER — HYDROMORPHONE HYDROCHLORIDE 2 MG/1
1 TABLET ORAL
Refills: 0 | Status: DISCONTINUED | OUTPATIENT
Start: 2024-08-27 | End: 2024-08-27

## 2024-08-27 RX ADMIN — OXYCODONE HYDROCHLORIDE 10 MILLIGRAM(S): 5 TABLET ORAL at 20:54

## 2024-08-27 RX ADMIN — ACETAMINOPHEN 975 MILLIGRAM(S): 325 TABLET ORAL at 05:36

## 2024-08-27 RX ADMIN — ACETAMINOPHEN 975 MILLIGRAM(S): 325 TABLET ORAL at 00:26

## 2024-08-27 RX ADMIN — INDOCYANINE GREEN AND WATER 5 MILLIGRAM(S): KIT at 13:56

## 2024-08-27 RX ADMIN — OXYCODONE HYDROCHLORIDE 10 MILLIGRAM(S): 5 TABLET ORAL at 21:24

## 2024-08-27 RX ADMIN — CEFOTETAN 100 GRAM(S): 2 INJECTION, POWDER, FOR SOLUTION INTRAMUSCULAR; INTRAVENOUS at 05:37

## 2024-08-27 RX ADMIN — ACETAMINOPHEN 975 MILLIGRAM(S): 325 TABLET ORAL at 06:13

## 2024-08-27 RX ADMIN — BICTEGRAVIR SODIUM, EMTRICITABINE, AND TENOFOVIR ALAFENAMIDE FUMARATE 1 TABLET(S): 50; 200; 25 TABLET ORAL at 20:53

## 2024-08-27 NOTE — ASU PREOP CHECKLIST - TEMPERATURE IN FAHRENHEIT (DEGREES F)
AVS sheet reviewed. Educated on self care, follow-up appointments, and signs/symptoms of pre-eclampsia. Patient verbalizes understanding.    98.4

## 2024-08-27 NOTE — PATIENT PROFILE ADULT - FUNCTIONAL ASSESSMENT - BASIC MOBILITY 2.
postop with continued delayed secondary healing of palmar wound. Patient with significant stiffness of entire hand and adjacent fingers. Continue with therapy. Continue with local dressing changes. Patient cannot return to work as his wound is still open and draining. Recommend continued wound care and follow-up 2 weeks. I concur with the findings and plan as documented.     Roseanne Sin MD  10/5/2020 4 = No assist / stand by assistance

## 2024-08-27 NOTE — PROGRESS NOTE ADULT - NS ATTEND AMEND GEN_ALL_CORE FT
I have seen and examined this patient with the surgical team on AM rounds.  No acute events overnight.  Patient appears well this morning.  States his pain is improved.  Denies nausea/vomiting.  Endorses flatus.  Non-TTP on exam.  Planned for RAL cholecystectomy for today.

## 2024-08-27 NOTE — BRIEF OPERATIVE NOTE - OPERATION/FINDINGS
robot-assisted cholecystectomy, critical view of safety achieved, hemolock clips placed across cystic duct and cystic artery, some oozing from liver bed, controlled with electrocautery, surgicel placed in gb fossa

## 2024-08-27 NOTE — PATIENT PROFILE ADULT - FALL HARM RISK - UNIVERSAL INTERVENTIONS
Bed in lowest position, wheels locked, appropriate side rails in place/Call bell, personal items and telephone in reach/Instruct patient to call for assistance before getting out of bed or chair/Non-slip footwear when patient is out of bed/[place] to call system/Physically safe environment - no spills, clutter or unnecessary equipment/Purposeful Proactive Rounding/Room/bathroom lighting operational, light cord in reach

## 2024-08-28 ENCOUNTER — TRANSCRIPTION ENCOUNTER (OUTPATIENT)
Age: 44
End: 2024-08-28

## 2024-08-28 VITALS
RESPIRATION RATE: 18 BRPM | TEMPERATURE: 99 F | HEART RATE: 71 BPM | OXYGEN SATURATION: 98 % | DIASTOLIC BLOOD PRESSURE: 66 MMHG | SYSTOLIC BLOOD PRESSURE: 120 MMHG

## 2024-08-28 LAB
ANION GAP SERPL CALC-SCNC: 12 MMOL/L — SIGNIFICANT CHANGE UP (ref 5–17)
ANISOCYTOSIS BLD QL: SLIGHT — SIGNIFICANT CHANGE UP
BASOPHILS # BLD AUTO: 0 K/UL — SIGNIFICANT CHANGE UP (ref 0–0.2)
BASOPHILS NFR BLD AUTO: 0 % — SIGNIFICANT CHANGE UP (ref 0–2)
BUN SERPL-MCNC: 10.3 MG/DL — SIGNIFICANT CHANGE UP (ref 8–20)
CALCIUM SERPL-MCNC: 8.4 MG/DL — SIGNIFICANT CHANGE UP (ref 8.4–10.5)
CHLORIDE SERPL-SCNC: 104 MMOL/L — SIGNIFICANT CHANGE UP (ref 96–108)
CO2 SERPL-SCNC: 23 MMOL/L — SIGNIFICANT CHANGE UP (ref 22–29)
CREAT SERPL-MCNC: 0.7 MG/DL — SIGNIFICANT CHANGE UP (ref 0.5–1.3)
EGFR: 118 ML/MIN/1.73M2 — SIGNIFICANT CHANGE UP
EOSINOPHIL # BLD AUTO: 0 K/UL — SIGNIFICANT CHANGE UP (ref 0–0.5)
EOSINOPHIL NFR BLD AUTO: 0 % — SIGNIFICANT CHANGE UP (ref 0–6)
GLUCOSE SERPL-MCNC: 112 MG/DL — HIGH (ref 70–99)
HCT VFR BLD CALC: 40.8 % — SIGNIFICANT CHANGE UP (ref 39–50)
HGB BLD-MCNC: 14 G/DL — SIGNIFICANT CHANGE UP (ref 13–17)
LYMPHOCYTES # BLD AUTO: 1.13 K/UL — SIGNIFICANT CHANGE UP (ref 1–3.3)
LYMPHOCYTES # BLD AUTO: 7.8 % — LOW (ref 13–44)
MAGNESIUM SERPL-MCNC: 1.8 MG/DL — SIGNIFICANT CHANGE UP (ref 1.6–2.6)
MANUAL SMEAR VERIFICATION: SIGNIFICANT CHANGE UP
MCHC RBC-ENTMCNC: 30.2 PG — SIGNIFICANT CHANGE UP (ref 27–34)
MCHC RBC-ENTMCNC: 34.3 GM/DL — SIGNIFICANT CHANGE UP (ref 32–36)
MCV RBC AUTO: 88.1 FL — SIGNIFICANT CHANGE UP (ref 80–100)
MONOCYTES # BLD AUTO: 1.13 K/UL — HIGH (ref 0–0.9)
MONOCYTES NFR BLD AUTO: 7.8 % — SIGNIFICANT CHANGE UP (ref 2–14)
NEUTROPHILS # BLD AUTO: 11.85 K/UL — HIGH (ref 1.8–7.4)
NEUTROPHILS NFR BLD AUTO: 81.8 % — HIGH (ref 43–77)
PHOSPHATE SERPL-MCNC: 2.6 MG/DL — SIGNIFICANT CHANGE UP (ref 2.4–4.7)
PLAT MORPH BLD: NORMAL — SIGNIFICANT CHANGE UP
PLATELET # BLD AUTO: 276 K/UL — SIGNIFICANT CHANGE UP (ref 150–400)
POIKILOCYTOSIS BLD QL AUTO: SLIGHT — SIGNIFICANT CHANGE UP
POLYCHROMASIA BLD QL SMEAR: SLIGHT — SIGNIFICANT CHANGE UP
POTASSIUM SERPL-MCNC: 3.6 MMOL/L — SIGNIFICANT CHANGE UP (ref 3.5–5.3)
POTASSIUM SERPL-SCNC: 3.6 MMOL/L — SIGNIFICANT CHANGE UP (ref 3.5–5.3)
RBC # BLD: 4.63 M/UL — SIGNIFICANT CHANGE UP (ref 4.2–5.8)
RBC # FLD: 12.4 % — SIGNIFICANT CHANGE UP (ref 10.3–14.5)
RBC BLD AUTO: ABNORMAL
SODIUM SERPL-SCNC: 139 MMOL/L — SIGNIFICANT CHANGE UP (ref 135–145)
VARIANT LYMPHS # BLD: 2.6 % — SIGNIFICANT CHANGE UP (ref 0–6)
WBC # BLD: 14.49 K/UL — HIGH (ref 3.8–10.5)
WBC # FLD AUTO: 14.49 K/UL — HIGH (ref 3.8–10.5)

## 2024-08-28 PROCEDURE — 81003 URINALYSIS AUTO W/O SCOPE: CPT

## 2024-08-28 PROCEDURE — 86900 BLOOD TYPING SEROLOGIC ABO: CPT

## 2024-08-28 PROCEDURE — 76705 ECHO EXAM OF ABDOMEN: CPT

## 2024-08-28 PROCEDURE — T1013: CPT

## 2024-08-28 PROCEDURE — 93005 ELECTROCARDIOGRAM TRACING: CPT

## 2024-08-28 PROCEDURE — 36415 COLL VENOUS BLD VENIPUNCTURE: CPT

## 2024-08-28 PROCEDURE — 86901 BLOOD TYPING SEROLOGIC RH(D): CPT

## 2024-08-28 PROCEDURE — 99285 EMERGENCY DEPT VISIT HI MDM: CPT

## 2024-08-28 PROCEDURE — 80053 COMPREHEN METABOLIC PANEL: CPT

## 2024-08-28 PROCEDURE — 99024 POSTOP FOLLOW-UP VISIT: CPT

## 2024-08-28 PROCEDURE — 74177 CT ABD & PELVIS W/CONTRAST: CPT | Mod: MC

## 2024-08-28 PROCEDURE — 96375 TX/PRO/DX INJ NEW DRUG ADDON: CPT

## 2024-08-28 PROCEDURE — 83735 ASSAY OF MAGNESIUM: CPT

## 2024-08-28 PROCEDURE — 96374 THER/PROPH/DIAG INJ IV PUSH: CPT

## 2024-08-28 PROCEDURE — 83690 ASSAY OF LIPASE: CPT

## 2024-08-28 PROCEDURE — 85025 COMPLETE CBC W/AUTO DIFF WBC: CPT

## 2024-08-28 PROCEDURE — C1889: CPT

## 2024-08-28 PROCEDURE — 80048 BASIC METABOLIC PNL TOTAL CA: CPT

## 2024-08-28 PROCEDURE — 88304 TISSUE EXAM BY PATHOLOGIST: CPT

## 2024-08-28 PROCEDURE — 85610 PROTHROMBIN TIME: CPT

## 2024-08-28 PROCEDURE — S2900: CPT

## 2024-08-28 PROCEDURE — 84100 ASSAY OF PHOSPHORUS: CPT

## 2024-08-28 PROCEDURE — 87086 URINE CULTURE/COLONY COUNT: CPT

## 2024-08-28 PROCEDURE — 85730 THROMBOPLASTIN TIME PARTIAL: CPT

## 2024-08-28 PROCEDURE — 82962 GLUCOSE BLOOD TEST: CPT

## 2024-08-28 PROCEDURE — 86850 RBC ANTIBODY SCREEN: CPT

## 2024-08-28 RX ORDER — IBUPROFEN 600 MG
600 TABLET ORAL EVERY 6 HOURS
Refills: 0 | Status: DISCONTINUED | OUTPATIENT
Start: 2024-08-28 | End: 2024-08-28

## 2024-08-28 RX ORDER — ACETAMINOPHEN 325 MG/1
975 TABLET ORAL EVERY 6 HOURS
Refills: 0 | Status: DISCONTINUED | OUTPATIENT
Start: 2024-08-28 | End: 2024-08-28

## 2024-08-28 RX ORDER — ACETAMINOPHEN 325 MG/1
3 TABLET ORAL
Qty: 0 | Refills: 0 | DISCHARGE
Start: 2024-08-28

## 2024-08-28 RX ORDER — OXYCODONE HYDROCHLORIDE 5 MG/1
1 TABLET ORAL
Qty: 18 | Refills: 0
Start: 2024-08-28 | End: 2024-08-30

## 2024-08-28 RX ORDER — POTASSIUM CHLORIDE 10 MEQ
40 TABLET, EXT RELEASE, PARTICLES/CRYSTALS ORAL ONCE
Refills: 0 | Status: DISCONTINUED | OUTPATIENT
Start: 2024-08-28 | End: 2024-08-28

## 2024-08-28 RX ORDER — OXYCODONE HYDROCHLORIDE 5 MG/1
5 TABLET ORAL EVERY 4 HOURS
Refills: 0 | Status: DISCONTINUED | OUTPATIENT
Start: 2024-08-28 | End: 2024-08-28

## 2024-08-28 RX ORDER — IBUPROFEN 600 MG
1 TABLET ORAL
Qty: 0 | Refills: 0 | DISCHARGE
Start: 2024-08-28

## 2024-08-28 RX ORDER — NALOXONE HCL 1 MG/ML
1 VIAL (ML) INJECTION
Qty: 1 | Refills: 0
Start: 2024-08-28

## 2024-08-28 RX ADMIN — ACETAMINOPHEN 975 MILLIGRAM(S): 325 TABLET ORAL at 06:59

## 2024-08-28 RX ADMIN — ACETAMINOPHEN 975 MILLIGRAM(S): 325 TABLET ORAL at 06:29

## 2024-08-28 RX ADMIN — ACETAMINOPHEN 975 MILLIGRAM(S): 325 TABLET ORAL at 00:49

## 2024-08-28 RX ADMIN — ACETAMINOPHEN 975 MILLIGRAM(S): 325 TABLET ORAL at 00:19

## 2024-08-28 NOTE — PROGRESS NOTE ADULT - ASSESSMENT
Patient is a 44M with h/o HIV compliant with meds who is currently admitted for acute cholecystitis. Underwent robotic assisted cholecystectomy yesterday    Plan:  -discharge home today  -pain control  -strict Is/Os  -continue home meds  -trend labs, replete electrolytes as needed  -encourage OOB  -incentive spirometry  -DVT ppx: SCDs, Lovenox 40 daily 
Patient is a 44M with h/o HIV compliant with meds who is currently admitted for acute cholecystitis. For RA yumiko today.    Plan:  - For RA yumiko today  - Further recommendations/plans to follow post operatively.

## 2024-08-28 NOTE — PROGRESS NOTE ADULT - NS ATTEND AMEND GEN_ALL_CORE FT
I have seen and examined this patient with the surgical team on AM rounds.  No acute events overnight.  Patient appears well this morning.  States his pain is improved.  Denies nausea/vomiting.  S/p RAL cholecystectomy yesterday, recovering well.  Appropriately TTP on exam, incisions appears clean and healthy.  Plan for DC home today.

## 2024-08-28 NOTE — DISCHARGE NOTE NURSING/CASE MANAGEMENT/SOCIAL WORK - PATIENT PORTAL LINK FT
You can access the FollowMyHealth Patient Portal offered by [facility] by registering at the following website: [email or web address]. By joining [employer]’s FollowMyHealth portal, you will also be able to view your health information using other applications (apps) compatible with our system.

## 2024-08-28 NOTE — DISCHARGE NOTE PROVIDER - NSDCMRMEDTOKEN_GEN_ALL_CORE_FT
[facility] Hospital Medicine  Discharge Summary      Patient Name: Kat Corley  MRN: [id number]  Admission Date: 1/8/2017  Hospital Length of Stay: 3 days  Discharge Date and Time: 1/11/2017 4:42 PM  Attending Physician: No att. providers found   Discharging Provider: Shahrzad Plasencia MD  Primary Care Provider: NAMITA Guerin      HPI:   Mrs. Corley presents for evaluation of chest pain. She describes the pain as a burning sensation to her LEFT chest which is non-radiating and intermittent. She denies shortness of breath. She denies known exacerbating or alleviating factors. She reports nausea and aversion to food this week. She reports epigastric discomfort. She reports fever and chills. She denies trauma. She denies alcohol consumption. She denies Acetaminophen use. She denies yellowing of her skin. She denies recent travel or ingestion of raw or undercooked food. She denies diarrhea. She reports 30 pounds weight loss in the past 4 months. She is unsure why she has lost the weight.    She was evaluated in the ED. She was found to have elevated lipase, liver enzymes and bilirubin. A CT of the abdomen revealed pancreatitis and dilated CBD. She has history of cholecystectomy when she was 21.      Interval History: pt with uneventful night. She is sleeping fairly. Pain is well controlled.      Review of Systems   Constitutional: Negative for chills and fever.   Respiratory: Negative for shortness of breath.   Cardiovascular: Negative for chest pain.   Gastrointestinal: Negative for abdominal pain.   Genitourinary: Negative for dysuria.   Neurological: Negative for dizziness and light-headedness.      Objective:      Vital Signs (Most Recent):  Temp: 97.7 °F (36.5 °C) (01/11/17 0800)  Pulse: 109 (01/11/17 0800)  Resp: 18 (01/11/17 0800)  BP: 118/86 (01/11/17 0800)  SpO2: (!) 93 % (01/11/17 0800) Vital Signs (24h Range):  Temp: [97.7 °F (36.5 °C)-99.1 °F (37.3 °C)] 97.7 °F (36.5 °C)  Pulse:  [102-118] 109  Resp: [16-19] 18  BP: (113-130)/(70-86) 118/86      Weight: 73.5 kg (162 lb)  Body mass index is 29.63 kg/(m^2).     Intake/Output Summary (Last 24 hours) at 01/11/17 1143  Last data filed at 01/11/17 0558    Gross per 24 hour   Intake 3222.92 ml   Output 0 ml   Net 3222.92 ml       Physical Exam   Constitutional: She appears well-developed and well-nourished. No distress.   HENT:   Head: Normocephalic and atraumatic.   Right Ear: External ear normal.   Left Ear: External ear normal.   Nose: Nose normal.   Mouth/Throat: Oropharynx is clear and moist. No oropharyngeal exudate.   Eyes: Conjunctivae are normal.   Neck: Neck supple. No JVD present.   Cardiovascular: Normal rate, regular rhythm and normal heart sounds.   Pulmonary/Chest: Effort normal and breath sounds normal. No respiratory distress.   Abdominal: Soft. Bowel sounds are normal. She exhibits no distension. There is no tenderness.   Musculoskeletal: She exhibits no edema.   Neurological: She is alert.   Psychiatric: She has a normal mood and affect. Her behavior is normal.   Vitals reviewed.      Indwelling Lines/Drains at time of discharge:   Lines/Drains/Airways          No matching active lines, drains, or airways        Hospital Course:   Pt admitted for pancreatitis. Received IVFs, anti emetics, narcotics. Kept NPO. CT abd/ pelv significant for extensive metastatic lesions in vertebral spine and abdomen. GI and oncology consulted. CT brain, CT chest does not reveal any primary source of malignancy. CEA and  significantly elevated. Pt taken to OR yesterday for biopsy of adrenal gland. Results pending. Pain was well controlled with IV narcotics. She will be transferred to [facility] for EUS with FNA and to further evaluate bile duct as cholangiocarcinoma is possible as primary. Case discussed with Dr. Kim who will admit the patient to her hospital service. PBS consult requested.      Consults:   Consults          Status Ordering Provider     Inpatient consult to Gastroenterology  Once     Provider:  Quirino Yanez MD    Completed BRENDA MONDRAGON     Inpatient consult to Oncology  Once     Provider:  Herlinda Arriola MD    Acknowledged JASKARAN SMITH          Significant Diagnostic Studies: Labs:   CMP   Recent Labs  Lab 01/10/17  0700 01/11/17  0533   * 135*   K 3.6 3.8   CL 97 95   CO2 26 30*    114*   BUN 5* 4*   CREATININE 0.5 0.5   CALCIUM 10.1 10.3   PROT 6.1 5.8*   ALBUMIN 2.0* 1.9*   BILITOT 5.3* 6.0*   ALKPHOS 1690* 1581*   AST 97* 87*   * 176*   ANIONGAP 12 10   ESTGFRAFRICA >60 >60   EGFRNONAA >60 >60    and CBC No results for input(s): WBC, HGB, HCT, PLT in the last 48 hours.  Radiology: CT scan: CT ABDOMEN PELVIS WITH CONTRAST:   Results for orders placed or performed during the hospital encounter of 01/08/17   CT Abdomen Pelvis With Contrast    Narrative    Axial images are obtained through the abdomen and pelvis following administration of 75 cc Omnipaque 350 IV contrast.  Images are displayed at bone and soft tissue windows L. ankle reconstruction as provided.    On bone windows there are numerous rounded lesions of the thoracic and lumbar spine consistent with extensive metastatic disease.  In all fracture right rib is noted posteriorly.  In this patient with right chest pain CT of the chest rule out pathologic rib fracture is suggested.  There is basilar atelectasis probably due to hypoventilation thorax or pleural effusion is seen.    The liver is of normal size and general CT density without a mass.  There is dilation of the common bile duct to the pancreas and a gallbladder is not seen.  The pancreas itself is bulkier than expected on this pelvic study.  A focal mass is not identified at this time.The  patient has a MRCP already scheduled.   There are 2 rounded accessory splenules near the splenic hilum and a third well-rounded 14 mm mass between the stomach and spleen  This could  represent a metastasis or a third small accessory spleen.  The spleen itself appears of normal contour and CT density.    Both adrenal glands are significantly enlarged the right measuring 4.4 cm and the left measuring 3.5 cm consistent with metastasis.  The abdominal aorta and inferior vena cava are of normal caliber.  Adenopathy or soft tissue masses are not seen.    The gastrointestinal organs appear normal without dilatation air-fluid levels or soft tissue masses.  A normal appendix is seen.    Within the pelvis the bladder is of normal contour without mass or asymmetry.  The uterus is not enlarged.  No free fluid or free air is seen.  Metastasis within the sacrum and the right ilium posteriorly are noted    Impression     Extensive rounded osteolytic bone metastases are identified in the sacrum lumbar and thoracic spines.  Bilateral adrenal masses are consistent with metastasis as well.  CT of the chest is recommended to search for a primary neoplasm and possible pathologic rib fracture.  Dilation of the common bile duct and bulkiness of the pancreas without a focal mass defined yet.  A 14 mm mass between the stomach and spleen may represent a third accessory spleen or a metastasis.  A gallbladder is not identified.      Electronically signed by: Sunday Schwartz MD  Date:     01/09/17  Time:    09:30        Pending Diagnostic Studies:     Procedure Component Value Units Date/Time    ACTH [[id number]] Collected:  01/10/17 0659    Order Status:  Sent Lab Status:  In process Updated:  01/10/17 1339    Specimen:  Blood from Blood     Feroz-Barr Virus antibody panel [[id number]] Collected:  01/10/17 0700    Order Status:  Sent Lab Status:  In process Updated:  01/10/17 1044    Specimen:  Blood from Blood     Pathology Tissue Specimen To Pathology, Radiology Biopsy [[id number]] Collected:  01/10/17 1626    Order Status:  Sent Lab Status:  In process Updated:  01/11/17 0722    Specimen:  Other, please specify          Final Active Diagnoses:    Diagnosis Date Noted POA    PRINCIPAL PROBLEM:  Cancer, metastatic [C80.1] 01/10/2017 Yes    Prediabetes [R73.03] 01/10/2017 Yes    Hyperbilirubinemia [E80.6] 01/09/2017 Yes    COPD (chronic obstructive pulmonary disease) [J44.9] 01/10/2017 Yes    Metastatic cancer to bone [C79.51] 01/11/2017 Yes    Elevated serum alkaline phosphatase level [R74.8] 01/10/2017 Yes    Hypomagnesemia [E83.42] 01/10/2017 Yes    Cigarette smoker [F17.210] 01/10/2017 Yes    Elevated liver enzymes [R74.8] 01/09/2017 Yes    Thrombocytosis [D47.3] 01/09/2017 Yes    Pancreatitis, acute [K85.90] 01/08/2017 Yes      Problems Resolved During this Admission:    Diagnosis Date Noted Date Resolved POA        Discharged Condition: good    Disposition: Hospice/Medical Facility- [facility]     Medications:  Transfer Medications (for Discharge Readmit only):   Current Facility-Administered Medications   Medication Dose Route Frequency Provider Last Rate Last Dose    0.9%  NaCl infusion   Intravenous Continuous Shane Singer PA-C 125 mL/hr at 01/11/17 1102      albuterol-ipratropium 2.5mg-0.5mg/3mL nebulizer solution 3 mL  3 mL Nebulization Q4H PRN Shahrzad Plasencia MD        alprazolam tablet 1 mg  1 mg Oral TID PRN Shahrzad Plasencia MD   1 mg at 01/11/17 1102    docusate sodium capsule 100 mg  100 mg Oral BID Shahrzad Plasencia MD   100 mg at 01/11/17 0806    enoxaparin injection 40 mg  40 mg Subcutaneous Daily Shane Singer PA-C   40 mg at 01/11/17 1103    gabapentin capsule 300 mg  300 mg Oral TID Shane Singer PA-C   300 mg at 01/11/17 1430    hydromorphone (PF) injection 1 mg  1 mg Intravenous Q6H PRN Shahrzad Plasencia MD   1 mg at 01/11/17 1057    morphine injection 4 mg  4 mg Intravenous Q4H PRN Shahrzad Plasencia MD   4 mg at 01/11/17 1434    ondansetron injection 4 mg  4 mg Intravenous Q6H PRN Shane Singer PA-C        pantoprazole injection 40 mg  40 mg Intravenous Daily  Shane Singer PA-C   40 mg at 01/11/17 0806    piperacillin-tazobactam 4.5 g in dextrose 5 % 100 mL IVPB (ready to mix system)  4.5 g Intravenous Q8H Shane Singer PA-C 25 mL/hr at 01/11/17 1430 4.5 g at 01/11/17 1430    promethazine (PHENERGAN) 12.5 mg in dextrose 5 % 50 mL IVPB  12.5 mg Intravenous Q6H PRN Shane Singer PA-C         Current Outpatient Prescriptions   Medication Sig Dispense Refill    gabapentin (NEURONTIN) 300 MG capsule Take 300 mg by mouth 3 (three) times daily.      naproxen sodium (ANAPROX) 550 MG tablet Take 550 mg by mouth every 12 (twelve) hours.       Time spent on the discharge of patient: 60 minutes    Shahrzad Plasencia MD  Department of Hospital Medicine  [facility]   Descovy 200 mg-25 mg oral tablet: 1 tab(s) orally once a day  methocarbamol 750 mg oral tablet: 2 tab(s) orally every 8 hours   acetaminophen 325 mg oral tablet: 3 tab(s) orally every 6 hours As needed Mild Pain (1 - 3)  Descovy 200 mg-25 mg oral tablet: 1 tab(s) orally once a day  ibuprofen 600 mg oral tablet: 1 tab(s) orally every 6 hours As needed Moderate Pain (4 - 6)  Narcan 4 mg/0.1 mL nasal spray: 1 spray(s) intranasally once ** use in the event of opioid overdose and call 911 immediately **  oxyCODONE 5 mg oral tablet: 1 tab(s) orally every 4 hours as needed for Severe Pain (7 - 10) MDD: 6 tabs

## 2024-08-28 NOTE — DISCHARGE NOTE PROVIDER - NSFOLLOWUPCLINICS_GEN_ALL_ED_FT
[facility] Acute Care Surgery  Acute Care Surgery  [address] [place] [id number]  Phone: [phone]  Fax:

## 2024-08-28 NOTE — DISCHARGE NOTE NURSING/CASE MANAGEMENT/SOCIAL WORK - NSDCPEFALRISK_GEN_ALL_CORE
For information on Fall & Injury Prevention, visit: [email or web address] OR  [email or web address] OR  https://www.cdc.gov/steadi/patient.html

## 2024-08-28 NOTE — PROGRESS NOTE ADULT - SUBJECTIVE AND OBJECTIVE BOX
Patient seen and examined at bedside. VSS, AF. Patient states that pain is well controlled with pain meds. Denies nausea or vomiting. Patient does endorse having history of HIV for approximately 10 years that was not documented on admission. He is maintained on Biktarvy and had labs drawn 1 month ago of which he states he is undetectable and CD count >500.     Vitals:  Vital Signs Last 24 Hrs  T(C): 36.9 (27 Aug 2024 13:28), Max: 37.1 (27 Aug 2024 09:40)  T(F): 98.4 (27 Aug 2024 13:28), Max: 98.8 (27 Aug 2024 09:40)  HR: 84 (27 Aug 2024 13:28) (55 - 88)  BP: 137/95 (27 Aug 2024 13:28) (120/77 - 150/88)  BP(mean): --  RR: 16 (27 Aug 2024 13:28) (13 - 19)  SpO2: 100% (27 Aug 2024 13:28) (95% - 100%)    Parameters below as of 27 Aug 2024 09:40  Patient On (Oxygen Delivery Method): room air    Labs:  08-27    139  |  106  |  10.2  ----------------------------<  85  3.5   |  22.0  |  0.73    Ca    8.7      27 Aug 2024 05:21    TPro  6.2<L>  /  Alb  3.6  /  TBili  0.6  /  DBili  x   /  AST  19  /  ALT  26  /  AlkPhos  77  08-27                            14.0   6.80  )-----------( 260      ( 27 Aug 2024 05:21 )             40.6     Exam:  Gen: pt lying in bed, alert, in NAD  Resp: unlabored  CVS: RRR  Abd: soft, NT, ND  Ext: moving all extremities spontaneously, sensation intact, pulses 2+  
Patient seen and examined at bedside. No acute events overnight, having some mild abdominal pain but improved compared to pre-op, tolerated diet last night    Vitals:  Vital Signs Last 24 Hrs  T(C): 37.1 (28 Aug 2024 04:44), Max: 37.1 (27 Aug 2024 09:40)  T(F): 98.7 (28 Aug 2024 04:44), Max: 98.8 (27 Aug 2024 09:40)  HR: 67 (28 Aug 2024 04:44) (66 - 84)  BP: 120/68 (28 Aug 2024 04:44) (118/75 - 140/88)  BP(mean): 110 (27 Aug 2024 19:00) (88 - 110)  RR: 17 (28 Aug 2024 04:44) (13 - 17)  SpO2: 95% (28 Aug 2024 04:44) (94% - 100%)    Parameters below as of 28 Aug 2024 04:44  Patient On (Oxygen Delivery Method): room air        Labs:  08-28    139  |  104  |  10.3  ----------------------------<  112<H>  3.6   |  23.0  |  0.70    Ca    8.4      28 Aug 2024 05:32  Phos  2.6     08-28  Mg     1.8     08-28    TPro  6.2<L>  /  Alb  3.6  /  TBili  0.6  /  DBili  x   /  AST  19  /  ALT  26  /  AlkPhos  77  08-27                            14.0   14.49 )-----------( 276      ( 28 Aug 2024 05:32 )             40.8       Exam:  Gen: pt lying in bed, alert, in NAD  Resp: unlabored on room air  CVS: RRR  Abd: soft, ND, TTP appropriate for POD1, incisions c/d/i  Ext: moving all extremities spontaneously, sensation intact, pulses 2+

## 2024-08-28 NOTE — CHART NOTE - NSCHARTNOTEFT_GEN_A_CORE
Post-op Check    Subjective:  Pt resting comfortably,  complaints at this time. Pain well controlled on current regiment. Denies chest pain, SOB, palpitations.     STATUS POST:  Robot-assisted cholecystectomy    POST OPERATIVE DAY #: 0    MEDICATIONS  (STANDING):  acetaminophen     Tablet .. 975 milliGRAM(s) Oral every 6 hours  bictegravir 50 mG/emtricitabine 200 mG/tenofovir alafenamide 25 mG (BIKTARVY) 1 Tablet(s) Oral daily  enoxaparin Injectable 40 milliGRAM(s) SubCutaneous every 24 hours  potassium chloride    Tablet ER 40 milliEquivalent(s) Oral once    MEDICATIONS  (PRN):  oxyCODONE    IR 5 milliGRAM(s) Oral every 4 hours PRN Moderate Pain (4 - 6)  oxyCODONE    IR 10 milliGRAM(s) Oral every 6 hours PRN Severe Pain (7 - 10)      Vital Signs Last 24 Hrs  T(C): 36.3 (27 Aug 2024 19:00), Max: 37.1 (27 Aug 2024 09:40)  T(F): 97.3 (27 Aug 2024 19:00), Max: 98.8 (27 Aug 2024 09:40)  HR: 68 (27 Aug 2024 19:45) (68 - 88)  BP: 138/67 (27 Aug 2024 19:45) (118/75 - 149/90)  BP(mean): 110 (27 Aug 2024 19:00) (88 - 110)  RR: 16 (27 Aug 2024 19:45) (13 - 18)  SpO2: 98% (27 Aug 2024 19:45) (95% - 100%)    Parameters below as of 27 Aug 2024 19:45  Patient On (Oxygen Delivery Method): room air        Physical Exam:  Constitutional: NAD  Respiratory: no accessory muscle use, respirations non-labored  GI: abdomen softly distended, no peritonitis , normal post-surgical ttp  Neurological: A&O x 3;     A:     P:  Pain control  OOB as tolerated  Encourage IS  DVT ppx  f/u am labs  regular diet  dc home this am

## 2024-08-28 NOTE — DISCHARGE NOTE PROVIDER - NSDCCPCAREPLAN_GEN_ALL_CORE_FT
PRINCIPAL DISCHARGE DIAGNOSIS  Diagnosis: Acute cholecystitis  Assessment and Plan of Treatment: - FOLLOW UP: Please call and make an appointment with the [facility] 10-14 days after discharge. Also, please call and make an appointment with your primary care physician as per your usual schedule.   - ACTIVITY: May return to normal activities as tolerated, however refrain from heavy lifting > 10-15 lbs.  - DIET: May continue regular diet.  - MEDICATIONS: Please take all medications listed on your discharge paperwork as prescribed. Pain medication (oxycodone) has been prescribed for you. Please take this medication only as it has been prescribed. Do not drive, operate heavy machinery, or make important decisions while taking narcotics.  You are encouraged to take over-the-counter tylenol and/or ibuprofen for pain relief when you feel your pain no longer warrants the use of narcotic pain medications.  - WOUND CARE: Please, keep wound site clean and dry. You may shower, but do not bathe, swim or submerge wounds in water for extended period of time.  Patient is advised to RETURN TO THE EMERGENCY DEPARTMENT for any of the following - worsening pain, fever/chills, nausea/vomiting, altered mental status, chest pain, shortness of breath, or any other new / worsening symptom.

## 2024-08-28 NOTE — DISCHARGE NOTE PROVIDER - HOSPITAL COURSE
Pt is a 43 y/o male who presented w/ complaint of abdominal pain & nausea. Imaging studies suggestive of acute cholecystitis. Patient was admitted to the acute care surgery service. Taken to the OR on 8/27 for robotic assisted cholecystectomy. Post-op pt tolerating diet, pain controlled, OOB ambulating and voiding. Stable for discharge with outpatient follow-up as outlined below.    Patient is advised to RETURN TO THE EMERGENCY DEPARTMENT for any of the following - worsening pain, fever/chills, nausea/vomiting, altered mental status, chest pain, shortness of breath, or any other new / worsening symptom.

## 2024-09-06 LAB — SURGICAL PATHOLOGY STUDY: SIGNIFICANT CHANGE UP

## 2024-09-10 ENCOUNTER — APPOINTMENT (OUTPATIENT)
Dept: TRAUMA SURGERY | Facility: CLINIC | Age: 44
End: 2024-09-10

## 2024-09-10 PROBLEM — Z00.00 ENCOUNTER FOR PREVENTIVE HEALTH EXAMINATION: Status: ACTIVE | Noted: 2024-09-10
